# Patient Record
Sex: FEMALE | Race: BLACK OR AFRICAN AMERICAN | NOT HISPANIC OR LATINO | Employment: UNEMPLOYED | URBAN - METROPOLITAN AREA
[De-identification: names, ages, dates, MRNs, and addresses within clinical notes are randomized per-mention and may not be internally consistent; named-entity substitution may affect disease eponyms.]

---

## 2021-07-15 ENCOUNTER — OFFICE VISIT (OUTPATIENT)
Dept: GASTROENTEROLOGY | Facility: CLINIC | Age: 46
End: 2021-07-15
Payer: COMMERCIAL

## 2021-07-15 VITALS
HEIGHT: 67 IN | WEIGHT: 166.6 LBS | DIASTOLIC BLOOD PRESSURE: 98 MMHG | HEART RATE: 111 BPM | BODY MASS INDEX: 26.15 KG/M2 | SYSTOLIC BLOOD PRESSURE: 138 MMHG

## 2021-07-15 DIAGNOSIS — R13.19 ESOPHAGEAL DYSPHAGIA: ICD-10-CM

## 2021-07-15 DIAGNOSIS — K59.01 SLOW TRANSIT CONSTIPATION: ICD-10-CM

## 2021-07-15 DIAGNOSIS — K21.9 GASTROESOPHAGEAL REFLUX DISEASE WITHOUT ESOPHAGITIS: Primary | ICD-10-CM

## 2021-07-15 PROCEDURE — 99244 OFF/OP CNSLTJ NEW/EST MOD 40: CPT | Performed by: INTERNAL MEDICINE

## 2021-07-15 RX ORDER — PAROXETINE 10 MG/1
10 TABLET, FILM COATED ORAL DAILY
COMMUNITY
Start: 2021-06-29

## 2021-07-15 RX ORDER — DICLOFENAC SODIUM 75 MG/1
TABLET, DELAYED RELEASE ORAL
COMMUNITY
Start: 2021-06-28

## 2021-07-15 RX ORDER — ZINC GLUCONATE 50 MG
50 TABLET ORAL DAILY
COMMUNITY

## 2021-07-15 RX ORDER — IBUPROFEN 200 MG
400 TABLET ORAL EVERY 6 HOURS PRN
COMMUNITY
Start: 2021-06-24

## 2021-07-15 RX ORDER — NICOTINE 14MG/24HR
1 PATCH, TRANSDERMAL 24 HOURS TRANSDERMAL DAILY
COMMUNITY
Start: 2021-05-27

## 2021-07-15 RX ORDER — CYCLOBENZAPRINE HCL 5 MG
TABLET ORAL
COMMUNITY
Start: 2021-06-28

## 2021-07-15 RX ORDER — GLYBURIDE 2.5 MG/1
TABLET ORAL
COMMUNITY
Start: 2021-05-07 | End: 2021-09-10

## 2021-07-15 RX ORDER — DIPHENOXYLATE HYDROCHLORIDE AND ATROPINE SULFATE 2.5; .025 MG/1; MG/1
1 TABLET ORAL DAILY
COMMUNITY

## 2021-07-15 RX ORDER — LORATADINE AND PSEUDOEPHEDRINE SULFATE 10; 240 MG/1; MG/1
1 TABLET, EXTENDED RELEASE ORAL DAILY
COMMUNITY
Start: 2021-05-27 | End: 2022-05-27

## 2021-07-15 RX ORDER — PANTOPRAZOLE SODIUM 40 MG/1
40 TABLET, DELAYED RELEASE ORAL 2 TIMES DAILY
COMMUNITY
Start: 2021-06-19 | End: 2021-07-15 | Stop reason: SDUPTHER

## 2021-07-15 RX ORDER — PANTOPRAZOLE SODIUM 40 MG/1
40 TABLET, DELAYED RELEASE ORAL 2 TIMES DAILY
Qty: 60 TABLET | Refills: 4 | Status: SHIPPED | OUTPATIENT
Start: 2021-07-15 | End: 2022-05-18

## 2021-07-15 NOTE — PROGRESS NOTES
Consultation - [facility]) [facility]  Nnamdi Bell 1975 40 y o  female     ASSESSMENT @ PLAN:   She is a 80-year-old female with chronic GERD on Protonix and Pepcid who is failing her regimen with breakthrough heartburn regurgitation solid food dysphagia nausea and fullness in her abdomen  In addition she has slow motility constipation has never had colon cancer screening  1 will do EGD and colonoscopy to investigate    2 will go up on her Protonix to 40 mg p o  b i d     3 will start MiraLax 1 cap full daily    Chief Complaint:  GERD dysphagia and constipation    HPI:  She is a 80-year-old female with chronic gastroesophageal reflux disease who is been on medicines for a year for years  She is currently taking Protonix 40 mg every morning and Pepcid 40 mg p o  q h s     She reports that she has breakthrough symptoms daily despite this  She feels like food is sitting in her epigastrium  She has fullness  She takes out sick [place] and helps  She does report heartburn regurgitation she reports solid food dysphagia  She has nausea  She has no melena hematochezia no NSAID use  She had endoscopy in [facility] at [facility] in January 2020 that showed mild reflux esophagitis small hiatal hernia and minimal gastritis  In addition she reports constipation  She has straining and hardness of stools and incomplete evacuation and has never had a colonoscopy  She is currently taking some sort of over-the-counter herbal medication but she does not know what is in it  She has never tried MiraLax daily  Her grandmother had colon cancer  Nobody in the family had Crohn's disease or ulcerative colitis  REVIEW OF SYSTEMS:     CONSTITUTIONAL: Denies any fever, chills, or rigors  Good appetite, and no recent weight loss  HEENT: No earache or tinnitus  Denies hearing loss or visual disturbances  CARDIOVASCULAR: No chest pain or palpitations     RESPIRATORY: Denies any cough, hemoptysis, shortness of breath or dyspnea on exertion  GASTROINTESTINAL: As noted in the History of Present Illness  GENITOURINARY: No problems with urination  Denies any hematuria or dysuria  NEUROLOGIC: No dizziness or vertigo, denies headaches  MUSCULOSKELETAL: Denies any muscle or joint pain  SKIN: Denies skin rashes or itching  ENDOCRINE: Denies excessive thirst  Denies intolerance to heat or cold  PSYCHOSOCIAL: Denies depression or anxiety  Denies any recent memory loss  Past Medical History:   Diagnosis Date    Diabetes mellitus ([facility] )       Past Surgical History:   Procedure Laterality Date    CARPAL TUNNEL RELEASE       SECTION      DILATION AND CURETTAGE, DIAGNOSTIC / THERAPEUTIC       Social History     Socioeconomic History    Marital status: Legally      Spouse name: Not on file    Number of children: Not on file    Years of education: Not on file    Highest education level: Not on file   Occupational History    Not on file   Tobacco Use    Smoking status: Never Smoker    Smokeless tobacco: Never Used   Vaping Use    Vaping Use: Never used   Substance and Sexual Activity    Alcohol use: Yes     Comment: occaional    Drug use: Never    Sexual activity: Not on file   Other Topics Concern    Not on file   Social History Narrative    Not on file     Social Determinants of Health     Financial Resource Strain:     Difficulty of Paying Living Expenses:    Food Insecurity:     Worried About Running Out of Food in the Last Year:     Ran Out of Food in the Last Year:    Transportation Needs:     Lack of Transportation (Medical):      Lack of Transportation (Non-Medical):    Physical Activity:     Days of Exercise per Week:     Minutes of Exercise per Session:    Stress:     Feeling of Stress :    Social Connections:     Frequency of Communication with Friends and Family:     Frequency of Social Gatherings with Friends and Family:     Attends Confucianist Services:     Active Member of Clubs or Organizations:     Attends Club or Organization Meetings:     Marital Status:    Intimate Partner Violence:     Fear of Current or Ex-Partner:     Emotionally Abused:     Physically Abused:     Sexually Abused:      Family History   Problem Relation Age of Onset    Diabetes Mother     Lung cancer Father     Colon cancer Maternal Grandmother     Bone cancer Paternal Grandmother      Patient has no known allergies  Current Outpatient Medications   Medication Sig Dispense Refill    Cholecalciferol 125 MCG (5000 UT) capsule Take 5,000 Units by mouth daily      [facility] Allergy Relief-D  MG per 24 hr tablet Take 1 tablet by mouth daily      cyclobenzaprine (FLEXERIL) 5 mg tablet TAKE 1 TABLET 3 TIMES A DAY BY ORAL ROUTE AS DIRECTED   diclofenac (VOLTAREN) 75 mg EC tablet TAKE 1 TABLET TWICE A DAY BY ORAL ROUTE WITH MEALS FOR 30 DAYS   glyBURIDE (DIABETA) 2 5 mg tablet TAKE 1 TABLET (2 5 MG TOTAL) BY MOUTH 2 (TWO) TIMES A DAY WITH MEALS   ibuprofen (MOTRIN) 200 mg tablet Take 400 mg by mouth every 6 (six) hours as needed      loratadine-pseudoephedrine (Claritin-D 24 Hour)  mg per 24 hr tablet Take 1 tablet by mouth daily      metFORMIN (GLUCOPHAGE) 1000 MG tablet Take 1,000 mg by mouth 2 (two) times a day with meals      multivitamin (THERAGRAN) TABS Take 1 tablet by mouth daily      norethindrone-ethinyl estradiol 0 5/0 75/1-35 MG-MCG per tablet TAKE 1 TABLET BY MOUTH EVERY DAY      pantoprazole (PROTONIX) 40 mg tablet Take 1 tablet (40 mg total) by mouth 2 (two) times a day 60 tablet 4    PARoxetine (PAXIL) 10 mg tablet Take 10 mg by mouth daily      Probiotic Product (PROBIOTIC PO) Take by mouth      TURMERIC PO Take by mouth      zinc gluconate 50 mg tablet Take 50 mg by mouth daily       No current facility-administered medications for this visit         Blood pressure 138/98, pulse (!) 111, height 5' 7" (1 702 m), weight 75 6 kg (166 lb 9 6 oz)  PHYSICAL EXAM:     General Appearance:   Alert, cooperative, no distress, appears stated age    HEENT:   Normocephalic, atraumatic, anicteric      Neck:  Supple, symmetrical, trachea midline, no adenopathy;    thyroid: no enlargement/tenderness/nodules; no carotid  bruit or JVD    Lungs:   Clear to auscultation bilaterally; no rales, rhonchi or wheezing; respirations unlabored    Heart[de-identified]   S1 and S2 normal; regular rate and rhythm; no murmur, rub, or gallop     Abdomen:   Soft, non-tender, non-distended; normal bowel sounds; no masses, no organomegaly    Genitalia:   Deferred    Rectal:   Deferred    Extremities:  No cyanosis, clubbing or edema    Pulses:  2+ and symmetric all extremities    Skin:  Skin color, texture, turgor normal, no rashes or lesions    Lymph nodes:  No palpable cervical, axillary or inguinal lymphadenopathy        No results found for: WBC, HGB, HCT, MCV, PLT  No results found for: GLUCOSE, CALCIUM, NA, K, CO2, CL, BUN, CREATININE  No results found for: ALT, AST, GGT, ALKPHOS, BILITOT  No results found for: INR, PROTIME

## 2021-09-09 ENCOUNTER — TELEPHONE (OUTPATIENT)
Dept: GASTROENTEROLOGY | Facility: HOSPITAL | Age: 46
End: 2021-09-09

## 2021-09-10 ENCOUNTER — ANESTHESIA (OUTPATIENT)
Dept: GASTROENTEROLOGY | Facility: HOSPITAL | Age: 46
End: 2021-09-10

## 2021-09-10 ENCOUNTER — TELEPHONE (OUTPATIENT)
Dept: SURGERY | Facility: HOSPITAL | Age: 46
End: 2021-09-10

## 2021-09-10 ENCOUNTER — ANESTHESIA EVENT (OUTPATIENT)
Dept: GASTROENTEROLOGY | Facility: HOSPITAL | Age: 46
End: 2021-09-10

## 2021-09-10 ENCOUNTER — HOSPITAL ENCOUNTER (OUTPATIENT)
Dept: GASTROENTEROLOGY | Facility: HOSPITAL | Age: 46
Setting detail: OUTPATIENT SURGERY
Discharge: HOME | End: 2021-09-10
Attending: INTERNAL MEDICINE | Admitting: INTERNAL MEDICINE
Payer: COMMERCIAL

## 2021-09-10 VITALS
HEART RATE: 94 BPM | BODY MASS INDEX: 25.78 KG/M2 | RESPIRATION RATE: 16 BRPM | OXYGEN SATURATION: 99 % | TEMPERATURE: 98.2 F | DIASTOLIC BLOOD PRESSURE: 86 MMHG | HEIGHT: 67 IN | SYSTOLIC BLOOD PRESSURE: 141 MMHG | WEIGHT: 164.24 LBS

## 2021-09-10 DIAGNOSIS — K59.01 SLOW TRANSIT CONSTIPATION: ICD-10-CM

## 2021-09-10 DIAGNOSIS — K21.9 GASTROESOPHAGEAL REFLUX DISEASE WITHOUT ESOPHAGITIS: ICD-10-CM

## 2021-09-10 DIAGNOSIS — R13.19 ESOPHAGEAL DYSPHAGIA: ICD-10-CM

## 2021-09-10 LAB — GLUCOSE SERPL-MCNC: 147 MG/DL (ref 65–140)

## 2021-09-10 PROCEDURE — 43239 EGD BIOPSY SINGLE/MULTIPLE: CPT | Performed by: INTERNAL MEDICINE

## 2021-09-10 PROCEDURE — 88305 TISSUE EXAM BY PATHOLOGIST: CPT | Performed by: PATHOLOGY

## 2021-09-10 PROCEDURE — 45378 DIAGNOSTIC COLONOSCOPY: CPT | Performed by: INTERNAL MEDICINE

## 2021-09-10 PROCEDURE — 82948 REAGENT STRIP/BLOOD GLUCOSE: CPT

## 2021-09-10 RX ORDER — LIDOCAINE HYDROCHLORIDE 20 MG/ML
INJECTION, SOLUTION EPIDURAL; INFILTRATION; INTRACAUDAL; PERINEURAL AS NEEDED
Status: DISCONTINUED | OUTPATIENT
Start: 2021-09-10 | End: 2021-09-10

## 2021-09-10 RX ORDER — SODIUM CHLORIDE, SODIUM LACTATE, POTASSIUM CHLORIDE, CALCIUM CHLORIDE 600; 310; 30; 20 MG/100ML; MG/100ML; MG/100ML; MG/100ML
125 INJECTION, SOLUTION INTRAVENOUS CONTINUOUS
Status: CANCELLED | OUTPATIENT
Start: 2021-09-10

## 2021-09-10 RX ORDER — GLIPIZIDE 5 MG/1
5 TABLET, FILM COATED, EXTENDED RELEASE ORAL DAILY
COMMUNITY
Start: 2021-08-18

## 2021-09-10 RX ORDER — PROPOFOL 10 MG/ML
INJECTION, EMULSION INTRAVENOUS AS NEEDED
Status: DISCONTINUED | OUTPATIENT
Start: 2021-09-10 | End: 2021-09-10

## 2021-09-10 RX ORDER — SODIUM CHLORIDE, SODIUM LACTATE, POTASSIUM CHLORIDE, CALCIUM CHLORIDE 600; 310; 30; 20 MG/100ML; MG/100ML; MG/100ML; MG/100ML
INJECTION, SOLUTION INTRAVENOUS CONTINUOUS PRN
Status: DISCONTINUED | OUTPATIENT
Start: 2021-09-10 | End: 2021-09-10

## 2021-09-10 RX ADMIN — LIDOCAINE HYDROCHLORIDE 5 ML: 20 INJECTION, SOLUTION EPIDURAL; INFILTRATION; INTRACAUDAL; PERINEURAL at 08:26

## 2021-09-10 RX ADMIN — PROPOFOL 50 MG: 10 INJECTION, EMULSION INTRAVENOUS at 08:34

## 2021-09-10 RX ADMIN — PROPOFOL 150 MG: 10 INJECTION, EMULSION INTRAVENOUS at 08:26

## 2021-09-10 RX ADMIN — PROPOFOL 50 MG: 10 INJECTION, EMULSION INTRAVENOUS at 08:28

## 2021-09-10 RX ADMIN — PROPOFOL 20 MG: 10 INJECTION, EMULSION INTRAVENOUS at 08:39

## 2021-09-10 RX ADMIN — PROPOFOL 20 MG: 10 INJECTION, EMULSION INTRAVENOUS at 08:36

## 2021-09-10 RX ADMIN — SODIUM CHLORIDE, SODIUM LACTATE, POTASSIUM CHLORIDE, AND CALCIUM CHLORIDE: .6; .31; .03; .02 INJECTION, SOLUTION INTRAVENOUS at 08:27

## 2021-09-10 NOTE — H&P
History and Physical -  [facility]  Avelino Miller 40 y o  female MRN: [id number]                  HPI: Avelino Miller is a 40y o  year old female who presents for endoscopy colonoscopy for GERD and constipation      REVIEW OF SYSTEMS: Per the HPI, and otherwise unremarkable  Historical Information   Past Medical History:   Diagnosis Date    Anxiety     Depression     Diabetes mellitus (HCC)      Past Surgical History:   Procedure Laterality Date    CARPAL TUNNEL RELEASE       SECTION      DILATION AND CURETTAGE, DIAGNOSTIC / THERAPEUTIC       Social History   Social History     Substance and Sexual Activity   Alcohol Use Yes    Alcohol/week: 1 0 standard drinks    Types: 1 Glasses of wine per week    Comment: occaional- once every other week     Social History     Substance and Sexual Activity   Drug Use Never     Social History     Tobacco Use   Smoking Status Never Smoker   Smokeless Tobacco Never Used     Family History   Problem Relation Age of Onset    Diabetes Mother     Lung cancer Father     Colon cancer Maternal Grandmother     Bone cancer Paternal Grandmother        Meds/Allergies     (Not in a hospital admission)      No Known Allergies    Objective     Blood pressure 136/87, pulse 95, temperature (!) 97 3 °F (36 3 °C), temperature source Temporal, resp  rate 14, height 5' 7" (1 702 m), weight 74 5 kg (164 lb 3 9 oz), last menstrual period 2021, SpO2 99 %  PHYSICAL EXAM    /87   Pulse 95   Temp (!) 97 3 °F (36 3 °C) (Temporal)   Resp 14   Ht 5' 7" (1 702 m)   Wt 74 5 kg (164 lb 3 9 oz)   LMP 2021   SpO2 99%   BMI 25 72 kg/m²       Gen: NAD  CV: RRR  CHEST: Clear  ABD: soft, NT/ND  EXT: no edema      ASSESSMENT/PLAN:  This is a 40y o  year old female here for endoscopy colonoscopy, and she is stable and optimized for her procedure

## 2021-09-10 NOTE — ANESTHESIA PREPROCEDURE EVALUATION
Procedure:  COLONOSCOPY  EGD    Relevant Problems   GI/HEPATIC   (+) Esophageal dysphagia   (+) Gastroesophageal reflux disease without esophagitis        Physical Exam    Airway    Mallampati score: II         Dental   No notable dental hx     Cardiovascular  Rhythm: regular, Rate: normal,     Pulmonary  Pulmonary exam normal     Other Findings        Anesthesia Plan  ASA Score- 2     Anesthesia Type- IV sedation with anesthesia with ASA Monitors  Additional Monitors:   Airway Plan:           Plan Factors-Exercise tolerance (METS): >4 METS  Chart reviewed  Patient is not a current smoker  Patient not instructed to abstain from smoking on day of procedure  Patient did not smoke on day of surgery  Induction- intravenous  Postoperative Plan-     Informed Consent- Anesthetic plan and risks discussed with patient  I personally reviewed this patient with the CRNA  Discussed and agreed on the Anesthesia Plan with the CRNA  Rene Belle

## 2021-09-10 NOTE — ANESTHESIA POSTPROCEDURE EVALUATION
Post-Op Assessment Note    CV Status:  Stable  Pain Score: 0    Pain management: adequate     Mental Status:  Awake   Hydration Status:  Stable   PONV Controlled:  None   Airway Patency:  Patent      Post Op Vitals Reviewed: Yes      Staff: CRNA         No complications documented      BP   112/80   Temp     Pulse  70   Resp   20   SpO2   100

## 2021-09-10 NOTE — DISCHARGE INSTRUCTIONS
Upper Endoscopy   WHAT YOU NEED TO KNOW:   An upper endoscopy is also called an upper gastrointestinal (GI) endoscopy, or an esophagogastroduodenoscopy (EGD)  You may feel bloated, gassy, or have some abdominal discomfort after your procedure  Your throat may be sore for 24 to 36 hours  You may burp or pass gas from air that is still inside your body  DISCHARGE INSTRUCTIONS:   Call 911 for any of the following:   · You have sudden chest pain or trouble breathing  Seek care immediately if:   · You feel dizzy or faint  · You have trouble swallowing  · Your bowel movements are very dark or black  · Your abdomen is hard and firm and you have severe pain  · You vomit blood  Contact your healthcare provider if:   · You feel full or bloated and cannot burp or pass gas  · You have not had a bowel movement for 3 days after your procedure  · You have neck pain  · You have a fever or chills  · You have nausea or are vomiting  · You have a rash or hives  · You have questions or concerns about your endoscopy  Relieve a sore throat:  Suck on throat lozenges or crushed ice  Gargle with a small amount of warm salt water  Mix 1 teaspoon of salt and 1 cup of warm water to make salt water  Relieve gas and discomfort from bloating:  Lie on your right side with a heating pad on your abdomen  Take short walks to help pass gas  Eat small meals until bloating is relieved  Rest after your procedure: You have been given medicine to relax you  Do not  drive or make important decisions until the day after your procedure  Return to your normal activity as directed  You can usually return to work the day after your procedure  Follow up with your healthcare provider as directed:  Write down your questions so you remember to ask them during your visits     © 2017 [address] is for End User's use only and may not be sold, redistributed or otherwise used for commercial purposes  All illustrations and images included in CareNotes® are the copyrighted property of [employer]  or Ricky Dean  The above information is an  only  It is not intended as medical advice for individual conditions or treatments  Talk to your doctor, nurse or pharmacist before following any medical regimen to see if it is safe and effective for you  Colonoscopy   WHAT YOU NEED TO KNOW:   A colonoscopy is a procedure to examine the inside of your colon (intestine) with a scope  Polyps or tissue growths may have been removed during your colonoscopy  It is normal to feel bloated and to have some abdominal discomfort  You should be passing gas  If you have hemorrhoids or you had polyps removed, you may have a small amount of bleeding  DISCHARGE INSTRUCTIONS:   Seek care immediately if:   · You have a large amount of bright red blood in your bowel movements  · Your abdomen is hard and firm and you have severe pain  · You have sudden trouble breathing  Contact your healthcare provider if:   · You develop a rash or hives  · You have a fever within 24 hours of your procedure       · You have not had a bowel movement for 3 days after your procedure  · You have questions or concerns about your condition or care  Activity:   · Do not lift, strain, or run  for 3 days after your procedure  · Rest after your procedure  You have been given medicine to relax you  Do not  drive or make important decisions until the day after your procedure  Return to your normal activity as directed  · Relieve gas and discomfort from bloating  by lying on your right side with a heating pad on your abdomen  You may need to take short walks to help the gas move out  Eat small meals until bloating is relieved  If you had polyps removed: For 7 days after your procedure:  · Do not  take aspirin  · Do not  go on long car rides    Follow up with your healthcare provider as directed:  Write down your questions so you remember to ask them during your visits  © 2017 [address] is for End User's use only and may not be sold, redistributed or otherwise used for commercial purposes  All illustrations and images included in CareNotes® are the copyrighted property of [employer]  or Ricky Dean  The above information is an  only  It is not intended as medical advice for individual conditions or treatments  Talk to your doctor, nurse or pharmacist before following any medical regimen to see if it is safe and effective for you

## 2021-09-15 ENCOUNTER — TELEPHONE (OUTPATIENT)
Dept: GASTROENTEROLOGY | Facility: CLINIC | Age: 46
End: 2021-09-15

## 2021-09-15 NOTE — TELEPHONE ENCOUNTER
----- Message from Moshe Sutton MD sent at 9/15/2021 11:09 AM EDT -----  Please tell her all the biopsies were negative
Called and spoke to patient  Gave patient test results   Patient had no further questions or concerns
Detail Level: Simple
Detail Level: Zone
Hide Include Location In Plan Question?: No

## 2022-04-07 ENCOUNTER — TELEPHONE (OUTPATIENT)
Dept: GASTROENTEROLOGY | Facility: CLINIC | Age: 47
End: 2022-04-07

## 2022-04-07 ENCOUNTER — OFFICE VISIT (OUTPATIENT)
Dept: GASTROENTEROLOGY | Facility: CLINIC | Age: 47
End: 2022-04-07
Payer: COMMERCIAL

## 2022-04-07 VITALS
HEART RATE: 70 BPM | SYSTOLIC BLOOD PRESSURE: 132 MMHG | BODY MASS INDEX: 26.52 KG/M2 | DIASTOLIC BLOOD PRESSURE: 82 MMHG | WEIGHT: 165 LBS | HEIGHT: 66 IN

## 2022-04-07 DIAGNOSIS — K59.01 SLOW TRANSIT CONSTIPATION: Primary | ICD-10-CM

## 2022-04-07 DIAGNOSIS — R13.19 ESOPHAGEAL DYSPHAGIA: ICD-10-CM

## 2022-04-07 PROCEDURE — 99214 OFFICE O/P EST MOD 30 MIN: CPT | Performed by: INTERNAL MEDICINE

## 2022-04-07 RX ORDER — NORETHINDRONE AND ETHINYL ESTRADIOL 1 MG-35MCG
1 KIT ORAL DAILY
COMMUNITY
Start: 2022-03-31

## 2022-04-07 RX ORDER — GLIPIZIDE 10 MG/1
10 TABLET, FILM COATED, EXTENDED RELEASE ORAL DAILY
COMMUNITY
Start: 2022-03-24

## 2022-04-07 RX ORDER — LINACLOTIDE 145 UG/1
145 CAPSULE, GELATIN COATED ORAL DAILY
Qty: 90 CAPSULE | Refills: 4 | Status: SHIPPED | OUTPATIENT
Start: 2022-04-07 | End: 2022-04-28 | Stop reason: SDUPTHER

## 2022-04-07 NOTE — PROGRESS NOTES
Follow-up Note -  [facility]  Marisel Benito 1975 40 y o  female     ASSESSMENT @ PLAN:   She is a 61-year-old female with gastroesophageal reflux disease with ongoing dysphagia despite high-dose Protonix for months now  In addition she has IBS C and has failed MiraLax daily with stool in frequency hardness straining bloating distension cramping  She had endoscopy and colonoscopy with me on September 10, 2021  The endoscopy showed mild nonerosive gastritis in the colonoscopy was normal     1 we are going to give her Linzess 145 micro g daily  I sent a prescription to the pharmacy and I gave her some samples  2 she will continue on a high-fiber healthy diet    3 she will continue on her pantoprazole b i d  for now    4 order esophageal manometry test    Reason:  Dysphagia and constipation and IBS    HPI:  She is a 61-year-old female with known GERD and IBS C here for worsening this over problems  She is reporting stool in frequency every 3-4 days  She reports cramping bloating soreness in her abdomen with distension  She tried a [place] bush T and this did not help her  She has been doing MiraLax 17 g daily and she is failing this  She then got hemorrhoid and she has been doing a hydrocortisone cream given to her by her primary  She had a colonoscopy with me on September 10, 2021 that was normal   She has active unhealthy  She had endoscopy with me on the same day that showed mild nonerosive gastritis  She has been on Protonix b i d  since then  She was originally doing this for dysphagia  She reports that she feels like the scrambled ache that she ate is still up in her neck  If she bends over she will regurgitate food at times  She has had the dysphagia and is not been helped by the high-dose Protonix  REVIEW OF SYSTEMS:     CONSTITUTIONAL: Denies any fever, chills, or rigors  Good appetite, and no recent weight loss  HEENT: No earache or tinnitus   Denies hearing loss or visual disturbances  CARDIOVASCULAR: No chest pain or palpitations  RESPIRATORY: Denies any cough, hemoptysis, shortness of breath or dyspnea on exertion  GASTROINTESTINAL: As noted in the History of Present Illness  GENITOURINARY: No problems with urination  Denies any hematuria or dysuria  NEUROLOGIC: No dizziness or vertigo, denies headaches  MUSCULOSKELETAL: Denies any muscle or joint pain  SKIN: Denies skin rashes or itching  ENDOCRINE: Denies excessive thirst  Denies intolerance to heat or cold  PSYCHOSOCIAL: Denies depression or anxiety  Denies any recent memory loss  Past Medical History:   Diagnosis Date    Anxiety     Depression     Diabetes mellitus (HCC)       Past Surgical History:   Procedure Laterality Date    CARPAL TUNNEL RELEASE       SECTION      DILATION AND CURETTAGE, DIAGNOSTIC / THERAPEUTIC       Social History     Socioeconomic History    Marital status: Legally      Spouse name: Not on file    Number of children: Not on file    Years of education: Not on file    Highest education level: Not on file   Occupational History    Not on file   Tobacco Use    Smoking status: Never Smoker    Smokeless tobacco: Never Used   Vaping Use    Vaping Use: Never used   Substance and Sexual Activity    Alcohol use:  Yes     Alcohol/week: 1 0 standard drink     Types: 1 Glasses of wine per week     Comment: occaional- once every other week    Drug use: Never    Sexual activity: Not on file   Other Topics Concern    Not on file   Social History Narrative    Not on file     Social Determinants of Health     Financial Resource Strain: Not on file   Food Insecurity: Not on file   Transportation Needs: Not on file   Physical Activity: Not on file   Stress: Not on file   Social Connections: Not on file   Intimate Partner Violence: Not on file   Housing Stability: Not on file     Family History   Problem Relation Age of Onset    Diabetes Mother    Dillon Ornelas Lung cancer Father     Colon cancer Maternal Grandmother     Bone cancer Paternal Grandmother      Patient has no known allergies  Current Outpatient Medications   Medication Sig Dispense Refill    Alyacen 1/35 1-35 MG-MCG per tablet Take 1 tablet by mouth daily      Cholecalciferol 125 MCG (5000 UT) capsule Take 5,000 Units by mouth daily      [facility] Allergy Relief-D  MG per 24 hr tablet Take 1 tablet by mouth daily      cyclobenzaprine (FLEXERIL) 5 mg tablet TAKE 1 TABLET 3 TIMES A DAY BY ORAL ROUTE AS DIRECTED   diclofenac (VOLTAREN) 75 mg EC tablet TAKE 1 TABLET TWICE A DAY BY ORAL ROUTE WITH MEALS FOR 30 DAYS   glipiZIDE (GLUCOTROL XL) 10 mg 24 hr tablet Take 10 mg by mouth daily      glipiZIDE (GLUCOTROL XL) 5 mg 24 hr tablet Take 5 mg by mouth daily      hydrocortisone 2 5 % cream Apply 1 application topically 2 (two) times a day To affected area      ibuprofen (MOTRIN) 200 mg tablet Take 400 mg by mouth every 6 (six) hours as needed      loratadine-pseudoephedrine (Claritin-D 24 Hour)  mg per 24 hr tablet Take 1 tablet by mouth daily      metFORMIN (GLUCOPHAGE) 1000 MG tablet Take 1,000 mg by mouth 2 (two) times a day with meals      multivitamin (THERAGRAN) TABS Take 1 tablet by mouth daily      norethindrone-ethinyl estradiol 0 5/0 75/1-35 MG-MCG per tablet TAKE 1 TABLET BY MOUTH EVERY DAY      pantoprazole (PROTONIX) 40 mg tablet Take 1 tablet (40 mg total) by mouth 2 (two) times a day 60 tablet 4    PARoxetine (PAXIL) 10 mg tablet Take 10 mg by mouth daily      Probiotic Product (PROBIOTIC PO) Take by mouth      TURMERIC PO Take by mouth      zinc gluconate 50 mg tablet Take 50 mg by mouth daily      linaCLOtide (Linzess) 145 MCG CAPS Take 1 capsule (145 mcg total) by mouth in the morning 90 capsule 4     No current facility-administered medications for this visit  Blood pressure 132/82, pulse 70, height 5' 6" (1 676 m), weight 74 8 kg (165 lb)      PHYSICAL EXAM: General Appearance:   Alert, cooperative, no distress, appears stated age    HEENT:   Normocephalic, atraumatic, anicteric      Neck:  Supple, symmetrical, trachea midline, no adenopathy;    thyroid: no enlargement/tenderness/nodules; no carotid  bruit or JVD    Lungs:   Clear to auscultation bilaterally; no rales, rhonchi or wheezing; respirations unlabored    Heart[de-identified]   S1 and S2 normal; regular rate and rhythm; no murmur, rub, or gallop     Abdomen:   Soft, mild tender, non-distended; normal bowel sounds; no masses, no organomegaly    Genitalia:   Deferred    Rectal:   Deferred    Extremities:  No cyanosis, clubbing or edema    Pulses:  2+ and symmetric all extremities    Skin:  Skin color, texture, turgor normal, no rashes or lesions    Lymph nodes:  No palpable cervical, axillary or inguinal lymphadenopathy        No results found for: WBC, HGB, HCT, MCV, PLT  No results found for: GLUCOSE, CALCIUM, NA, K, CO2, CL, BUN, CREATININE  No results found for: ALT, AST, GGT, ALKPHOS, BILITOT  No results found for: INR, PROTIME

## 2022-04-07 NOTE — TELEPHONE ENCOUNTER
Patient called lmom to schedule OV  Called patient lmom for patient to call back   Dr Alecia Brarera or PA

## 2022-04-08 ENCOUNTER — TELEPHONE (OUTPATIENT)
Dept: GASTROENTEROLOGY | Facility: CLINIC | Age: 47
End: 2022-04-08

## 2022-04-08 NOTE — TELEPHONE ENCOUNTER
Deven pt-  RX; Linzess 145 mcg needs a prior authorization     Uses: [facility] [phone]  Please phone patient @ [phone] to advise  Jeanne Grayson

## 2022-04-11 ENCOUNTER — TELEPHONE (OUTPATIENT)
Dept: GASTROENTEROLOGY | Facility: CLINIC | Age: 47
End: 2022-04-11

## 2022-04-11 DIAGNOSIS — K59.00 CONSTIPATION, UNSPECIFIED CONSTIPATION TYPE: ICD-10-CM

## 2022-04-11 DIAGNOSIS — K59.00 CONSTIPATION, UNSPECIFIED CONSTIPATION TYPE: Primary | ICD-10-CM

## 2022-04-11 RX ORDER — LUBIPROSTONE 8 UG/1
8 CAPSULE, GELATIN COATED ORAL 2 TIMES DAILY WITH MEALS
Qty: 30 CAPSULE | Refills: 3 | Status: SHIPPED | OUTPATIENT
Start: 2022-04-11 | End: 2022-04-12

## 2022-04-11 NOTE — TELEPHONE ENCOUNTER
Received denial letter from [address] [place] [address]  Luis Feliped cecilia a message to see if there is something else she can try  Will scan denial letter in chart

## 2022-04-11 NOTE — TELEPHONE ENCOUNTER
Called [facility] , spoke to [address] on [facility]  PA # [id number]  Will take 24 hours for determinatiom  Will F/U    Marjorie Press

## 2022-04-12 RX ORDER — LUBIPROSTONE 8 UG/1
CAPSULE, GELATIN COATED ORAL
Qty: 30 CAPSULE | Refills: 3 | Status: SHIPPED | OUTPATIENT
Start: 2022-04-12 | End: 2022-04-28

## 2022-04-14 NOTE — TELEPHONE ENCOUNTER
Spoke to patient to let her know that medication Amitiza has been approved by insurance 4/13/22 to 4/13/23  Faxing to [facility] and faxing to patients chart

## 2022-04-19 ENCOUNTER — TELEPHONE (OUTPATIENT)
Dept: GASTROENTEROLOGY | Facility: HOSPITAL | Age: 47
End: 2022-04-19

## 2022-04-22 ENCOUNTER — TELEPHONE (OUTPATIENT)
Dept: GASTROENTEROLOGY | Facility: CLINIC | Age: 47
End: 2022-04-22

## 2022-04-22 ENCOUNTER — TELEPHONE (OUTPATIENT)
Dept: GASTROENTEROLOGY | Facility: HOSPITAL | Age: 47
End: 2022-04-22

## 2022-04-28 ENCOUNTER — HOSPITAL ENCOUNTER (OUTPATIENT)
Dept: GASTROENTEROLOGY | Facility: HOSPITAL | Age: 47
Discharge: HOME | End: 2022-04-28
Attending: INTERNAL MEDICINE
Payer: COMMERCIAL

## 2022-04-28 VITALS
TEMPERATURE: 98.6 F | HEART RATE: 92 BPM | DIASTOLIC BLOOD PRESSURE: 89 MMHG | OXYGEN SATURATION: 99 % | RESPIRATION RATE: 16 BRPM | SYSTOLIC BLOOD PRESSURE: 148 MMHG

## 2022-04-28 DIAGNOSIS — K59.01 SLOW TRANSIT CONSTIPATION: ICD-10-CM

## 2022-04-28 DIAGNOSIS — R13.19 ESOPHAGEAL DYSPHAGIA: Primary | ICD-10-CM

## 2022-04-28 DIAGNOSIS — R13.19 ESOPHAGEAL DYSPHAGIA: ICD-10-CM

## 2022-04-28 PROCEDURE — 91020 GASTRIC MOTILITY STUDIES: CPT

## 2022-04-28 RX ORDER — LINACLOTIDE 145 UG/1
145 CAPSULE, GELATIN COATED ORAL DAILY
Qty: 90 CAPSULE | Refills: 4 | Status: SHIPPED | OUTPATIENT
Start: 2022-04-28

## 2022-04-28 RX ORDER — LINACLOTIDE 145 UG/1
145 CAPSULE, GELATIN COATED ORAL DAILY
Qty: 90 CAPSULE | Refills: 4 | Status: SHIPPED | OUTPATIENT
Start: 2022-04-28 | End: 2022-04-28 | Stop reason: SDUPTHER

## 2022-04-28 NOTE — PROGRESS NOTES
I spoke to her    She could not tolerate the esophageal manometry so I am going to order a motility esophagram   '  Gave her the number to central scheduling    She tells me that the [address] [place] [address] worked better than the Amitiza so able put her back on the [address] [place] [address]

## 2022-04-28 NOTE — PERIOPERATIVE NURSING NOTE
Patient brought in the room and educated on procedure  Lidocaine 2% topical solution inserted via left nostril  Manometry catheter inserted via left nostril and as I was advancing the catheter patient began to gag and requested the catheter to be pulled out  I attempted three times  More lidocaine medication was given via left nostril but patient did not tolerate the insertion  Patient kept requesting to pull out the catheter with each insertion  Catheter removed intact  Discharge instructions given to patient to discuss with Dr Manjula Phillips the next plan of action and patient left the room in stable condition

## 2022-05-03 ENCOUNTER — TELEPHONE (OUTPATIENT)
Dept: GASTROENTEROLOGY | Facility: CLINIC | Age: 47
End: 2022-05-03

## 2022-05-03 NOTE — TELEPHONE ENCOUNTER
Dr Jose Clemons - [facility] called patient needs prior authorization for linzess [facility]     ID #[id number]  Phone [phone]

## 2022-05-11 ENCOUNTER — HOSPITAL ENCOUNTER (OUTPATIENT)
Dept: RADIOLOGY | Facility: HOSPITAL | Age: 47
Discharge: HOME | End: 2022-05-11
Attending: INTERNAL MEDICINE

## 2022-05-11 DIAGNOSIS — R13.19 ESOPHAGEAL DYSPHAGIA: ICD-10-CM

## 2022-05-17 ENCOUNTER — HOSPITAL ENCOUNTER (OUTPATIENT)
Dept: RADIOLOGY | Facility: HOSPITAL | Age: 47
Discharge: HOME | End: 2022-05-17
Attending: INTERNAL MEDICINE
Payer: COMMERCIAL

## 2022-05-17 PROCEDURE — 74220 X-RAY XM ESOPHAGUS 1CNTRST: CPT

## 2023-02-07 ENCOUNTER — NURSE TRIAGE (OUTPATIENT)
Dept: OTHER | Facility: OTHER | Age: 48
End: 2023-02-07

## 2023-02-07 DIAGNOSIS — R13.19 ESOPHAGEAL DYSPHAGIA: Primary | ICD-10-CM

## 2023-02-07 DIAGNOSIS — R11.0 NAUSEA: ICD-10-CM

## 2023-02-07 NOTE — TELEPHONE ENCOUNTER
Spoke with patient  History of GERD, dysphagia    Patient c/o ongoing globus sensation, "food sitting at the top of my chest", and nausea daily  Denies vomiting, fever, chills, SOB, weakness, black or bloody stools  Passing normal formed BM with linzess  Barium swallow was normal  Taking omeprazole 20mg BID  Patient will use mylanta or gaviscon QID PRN  Follow-up with Dr Carvalho only for 3/9/2023  Any suggestions until OV?

## 2023-02-07 NOTE — TELEPHONE ENCOUNTER
Patient reports difficulty swallowing and burning in her chest  She would like to be seen in the office and would like a call back to advise  Reason for Disposition  • Patient wants to be seen    Answer Assessment - Initial Assessment Questions  1  SYMPTOM: "Are you having difficulty swallowing liquids, solids, or both?"      Solids   2  ONSET: "When did the swallowing problems begin?"       A few days ago  3  CAUSE: "What do you think is causing the problem?"      Unaware  4  CHRONIC/RECURRENT: "Is this a new problem for you?"  If no, ask: "How long have you had this problem?" (e g , days, weeks, months)       Yes, GI   5  OTHER SYMPTOMS: "Do you have any other symptoms?" (e g , difficulty breathing, sore throat, swollen tongue, chest pain)     Burning in chest, decreased appetite  6   PREGNANCY: "Is there any chance you are pregnant?" "When was your last menstrual period?"     N/A    Protocols used: SWALLOWING DIFFICULTY-ADULT-OH

## 2023-02-08 RX ORDER — METOCLOPRAMIDE 5 MG/1
5 TABLET ORAL 2 TIMES DAILY
Qty: 60 TABLET | Refills: 1 | Status: SHIPPED | OUTPATIENT
Start: 2023-02-08 | End: 2023-02-13 | Stop reason: SDUPTHER

## 2023-02-08 NOTE — TELEPHONE ENCOUNTER
Please tell her we spoke  The motility esophagram was normal   Please give her Reglan 5 mg p o  twice daily  Please instruct her to take the nighttime dose prior to bedtime

## 2023-02-13 RX ORDER — METOCLOPRAMIDE 5 MG/1
5 TABLET ORAL 2 TIMES DAILY
Qty: 60 TABLET | Refills: 1 | Status: SHIPPED | OUTPATIENT
Start: 2023-02-13 | End: 2023-04-14

## 2023-03-02 ENCOUNTER — TELEPHONE (OUTPATIENT)
Dept: GASTROENTEROLOGY | Facility: CLINIC | Age: 48
End: 2023-03-02

## 2023-03-02 NOTE — TELEPHONE ENCOUNTER
Patients GI provider:  Dr Cyndi Contreras    Number to return call: [phone]    Reason for call: Pt calling because her PCP told her she should not be taking reglan because it had a warning not to take it with one of her other medications    Scheduled procedure/appointment date if applicable: Appt 7/9/39

## 2023-03-02 NOTE — TELEPHONE ENCOUNTER
Please let patient know that the warning more so applies to patients on nasal Regaln (spray into the nose ) She can still use the oral one, but cannot have more than 15 mg daily  Thanks!

## 2023-03-02 NOTE — TELEPHONE ENCOUNTER
Called and spoke with patient to see what medication her pcp said she is on that she cannot take Reglan  She believes is the PAXIL  When she looked over her meds she stated her pcp saw a red warning label come up  Please advise, thank you